# Patient Record
Sex: MALE
[De-identification: names, ages, dates, MRNs, and addresses within clinical notes are randomized per-mention and may not be internally consistent; named-entity substitution may affect disease eponyms.]

---

## 2019-11-08 ENCOUNTER — TRANSCRIPTION ENCOUNTER (OUTPATIENT)
Age: 54
End: 2019-11-08

## 2022-04-14 ENCOUNTER — APPOINTMENT (OUTPATIENT)
Dept: PAIN MANAGEMENT | Facility: CLINIC | Age: 57
End: 2022-04-14
Payer: COMMERCIAL

## 2022-04-14 VITALS — WEIGHT: 250 LBS | BODY MASS INDEX: 31.08 KG/M2 | HEIGHT: 75 IN

## 2022-04-14 PROCEDURE — 99213 OFFICE O/P EST LOW 20 MIN: CPT

## 2022-04-14 NOTE — ASSESSMENT
[FreeTextEntry1] : Discussion\par After discussing various treatment options with the patient including but not limited to oral medications, physical therapy, exercise,\par modalities as well as interventional spinal injections, we have decided with the following plan\par \par Medications Renewal\par The patient is stable on current pain medication with analgesia and without notable side effects or any obvious aberrant\par behaviors exhibited.\par \par There is clinically meaningful improvement in pain and function that outweighs risks to patient safety. I have discussed risks and\par realistic benefits of opioid therapy and patient and clinician responsibilities for managing therapy. Pain agreement has been\par signed.\par \par Will renew meds today\par \par Follow up in 4-6 weeks\par Follow up in 4-6 weeks or sooner if there are any problems.\par \par Conservative Care\par Continue Home exercises, stretching, activity modification, physical therapy, and conservative care.\par \par  \par I have consulted the  Registry for the purpose of reviewing the patient's controlled substance\par \par  Fall Risk Counseling\par The patient was provided Fall Risk counseling due to the prescribed medication(s). The patient is aware of the risks associated with\par their medication(s)\par

## 2022-04-14 NOTE — HISTORY OF PRESENT ILLNESS
[Lower back] : lower back [5] : 5 [Burning] : burning [Constant] : constant [Household chores] : household chores [Leisure] : leisure [Sleep] : sleep [Meds] : meds [Nothing helps with pain getting better] : Nothing helps with pain getting better [Sitting] : sitting [] : no [FreeTextEntry7] : b/l leg [de-identified] : Driving

## 2022-05-04 ENCOUNTER — APPOINTMENT (OUTPATIENT)
Dept: ORTHOPEDIC SURGERY | Facility: CLINIC | Age: 57
End: 2022-05-04
Payer: COMMERCIAL

## 2022-05-04 VITALS — BODY MASS INDEX: 31.08 KG/M2 | HEIGHT: 75 IN | WEIGHT: 250 LBS

## 2022-05-04 DIAGNOSIS — M75.122 COMPLETE ROTATOR CUFF TEAR OR RUPTURE OF LEFT SHOULDER, NOT SPECIFIED AS TRAUMATIC: ICD-10-CM

## 2022-05-04 PROCEDURE — 99213 OFFICE O/P EST LOW 20 MIN: CPT

## 2022-05-04 NOTE — PHYSICAL EXAM
[Left] : left shoulder [4 ___] : forward flexion 4[unfilled]/5 [5___] : internal rotation 5[unfilled]/5 [] : motor and sensory intact distally [FreeTextEntry9] : \par ER 25

## 2022-05-04 NOTE — ASSESSMENT
[FreeTextEntry1] : Now s/p L SA, SAD, acromioplasty, synovectomy, GHD, RCR.\par Continue PT for PROM, AROM as tolerated.\par OK for strengthening and resistance training.\par HEP.\par RTO 6 weeks.

## 2022-05-04 NOTE — HISTORY OF PRESENT ILLNESS
[5] : 5 [2] : 2 [Dull/Aching] : dull/aching [de-identified] :  DOS: 1/27/22 L SA, SAD, acromio, syn, GHD, RCR\par \par 5/4/22: Here for follow up. He is improving with PT but has some stiffnes and weakness.\par \par 3/22/22: Reports benefit and minimal pain. Working with PT. Not requiring anything for pain.\par \par 2/25/22: Here for follow up. Overall doing well with soreness. Stopped using the sling over the past 2-3 days.\par \par 2/8/22: Here for first follow up. He is off pain meds. He is in the sling.\par \par 12/28/21: Here for MRI with contrast review.\par \par MRI L shoulder with IV contrast:\par 1. 2.6 cm x 1.5 cm x 1.2 cm intramuscular ganglion within the anterior deltoid with decreased muscular edema when compared to prior study.\par 2. 10 x 12 mm full-thickness insertional tear of the supraspinatus with proximal tendinopathy and no muscle atrophy or tear. Remainder of the exam is unchanged.\par \par 12/15/21: Here for MRI review. \par \par MRI L shoulder:\par 1. AC joint arthrosis with narrowing of the supraspinatus outlet which can be seen with impingement. Infraspinatus tendinopathy and fraying. 15 mm full thickness insertional tear of the supraspinatus with bursitis and no muscle atrophy.\par 2. 18 mm x 12 mm septated fluid-signal intensity lesion within the anterior lateral deltoid and deep subcutaneous fat with muscular and soft tissue edema. Additional imaging with contrast is suggested to differentiate complex ganglion versus solid lesion.\par \par 12/1/21: 55 y/o M RHD here for left shoulder. He has been having pain for the past 1-2 months. He then went to [place] and [place] 11[place]21 [place] he had excruciating pain to the left shoulder. He has trouble lifting his arm and still painful. [] : Post Surgical Visit: no [FreeTextEntry1] : left shoulder [de-identified] : physical therapy

## 2022-06-14 ENCOUNTER — APPOINTMENT (OUTPATIENT)
Dept: ORTHOPEDIC SURGERY | Facility: CLINIC | Age: 57
End: 2022-06-14

## 2022-07-07 ENCOUNTER — APPOINTMENT (OUTPATIENT)
Dept: PAIN MANAGEMENT | Facility: CLINIC | Age: 57
End: 2022-07-07

## 2022-07-07 VITALS — WEIGHT: 253 LBS | BODY MASS INDEX: 31.46 KG/M2 | HEIGHT: 75 IN

## 2022-07-07 PROCEDURE — 99213 OFFICE O/P EST LOW 20 MIN: CPT

## 2022-07-07 NOTE — HISTORY OF PRESENT ILLNESS
[Lower back] : lower back [Dull/Aching] : dull/aching [Meds] : meds [5] : 5 [Burning] : burning [Constant] : constant [Household chores] : household chores [Leisure] : leisure [Sleep] : sleep [Nothing helps with pain getting better] : Nothing helps with pain getting better [Sitting] : sitting [de-identified] : Here for follow up.  will refill meds [] : no [FreeTextEntry7] : b/l leg [de-identified] : Driving

## 2022-10-06 ENCOUNTER — APPOINTMENT (OUTPATIENT)
Dept: PAIN MANAGEMENT | Facility: CLINIC | Age: 57
End: 2022-10-06

## 2022-10-06 VITALS — BODY MASS INDEX: 31.46 KG/M2 | HEIGHT: 75 IN | WEIGHT: 253 LBS

## 2022-10-06 PROCEDURE — 99213 OFFICE O/P EST LOW 20 MIN: CPT

## 2022-10-06 NOTE — HISTORY OF PRESENT ILLNESS
[Lower back] : lower back [Radiating] : radiating [Sharp] : sharp [Shooting] : shooting [Stabbing] : stabbing [Meds] : meds [Bending forward] : bending forward [Lying in bed] : lying in bed [Full time] : Work status: full time [5] : 5 [Burning] : burning [Dull/Aching] : dull/aching [Constant] : constant [Household chores] : household chores [Leisure] : leisure [Sleep] : sleep [Nothing helps with pain getting better] : Nothing helps with pain getting better [Sitting] : sitting [de-identified] : Here for follow up.  will refill meds [] : no [FreeTextEntry6] : shocking  [FreeTextEntry7] : b/l leg [de-identified] : Driving

## 2023-01-26 ENCOUNTER — APPOINTMENT (OUTPATIENT)
Dept: PAIN MANAGEMENT | Facility: CLINIC | Age: 58
End: 2023-01-26
Payer: COMMERCIAL

## 2023-01-26 VITALS — BODY MASS INDEX: 31.71 KG/M2 | WEIGHT: 255 LBS | HEIGHT: 75 IN

## 2023-01-26 PROCEDURE — 99213 OFFICE O/P EST LOW 20 MIN: CPT

## 2023-01-26 NOTE — HISTORY OF PRESENT ILLNESS
[Lower back] : lower back [Dull/Aching] : dull/aching [Radiating] : radiating [Tightness] : tightness [Constant] : constant [Household chores] : household chores [Leisure] : leisure [Sleep] : sleep [Social interactions] : social interactions [Meds] : meds [Nothing helps with pain getting better] : Nothing helps with pain getting better [Sitting] : sitting [Walking] : walking [Bending forward] : bending forward [Exercising] : exercising [Lying in bed] : lying in bed [Full time] : Work status: full time [5] : 5 [Burning] : burning [Sharp] : sharp [Shooting] : shooting [Stabbing] : stabbing [] : no [FreeTextEntry6] : shocking  [FreeTextEntry7] : b/l leg [de-identified] : Driving [de-identified] : Here for follow up.  will refill meds

## 2023-05-04 ENCOUNTER — APPOINTMENT (OUTPATIENT)
Dept: PAIN MANAGEMENT | Facility: CLINIC | Age: 58
End: 2023-05-04
Payer: COMMERCIAL

## 2023-05-04 VITALS — WEIGHT: 256 LBS | HEIGHT: 75 IN | BODY MASS INDEX: 31.83 KG/M2

## 2023-05-04 PROCEDURE — 20552 NJX 1/MLT TRIGGER POINT 1/2: CPT

## 2023-05-04 PROCEDURE — J3490M: CUSTOM

## 2023-05-04 PROCEDURE — 99214 OFFICE O/P EST MOD 30 MIN: CPT | Mod: 25

## 2023-05-04 NOTE — DISCUSSION/SUMMARY
[Medication Risks Reviewed] : Medication risks reviewed [de-identified] : Discussion\par After discussing various treatment options with the patient including but not limited to oral medications, physical therapy, exercise,\par modalities as well as interventional spinal injections, we have decided with the following plan\par \par Medications Renewal\par The patient is stable on current pain medication with analgesia and without notable side effects or any obvious aberrant\par behaviors exhibited.\par \par There is clinically meaningful improvement in pain and function that outweighs risks to patient safety. I have discussed risks and\par realistic benefits of opioid therapy and patient and clinician responsibilities for managing therapy. Pain agreement has been\par signed.\par \par Will renew meds today\par \par Follow up in 4-6 weeks\par Follow up in 4-6 weeks or sooner if there are any problems.\par \par Conservative Care\par \par Pt just recently refilled his final refill, so current script sent today won't be possible to fill until approx. 28 days from last fill. He understands this\par Continue Home exercises, stretching, activity modification, physical therapy, and conservative care.\par \par  \par I have consulted the  Registry for the purpose of reviewing the patient's controlled substance\par \par  Fall Risk Counseling\par The patient was provided Fall Risk counseling due to the prescribed medication(s). The patient is aware of the risks associated with\par their medication(s)\par \par Followup 3 months

## 2023-05-04 NOTE — PROCEDURE
[Trigger point 1-2 muscle groups] : trigger point 1-2 muscle groups [Right] : of the right [Rhomboid muscle] : rhomboid muscle [Pain] : pain [Inflammation] : inflammation [Alcohol] : alcohol [Ethyl Chloride sprayed topically] : ethyl chloride sprayed topically [Sterile technique used] : sterile technique used [___ cc    1%] : Lidocaine ~Vcc of 1%  [___ cc    0.25%] : Bupivacaine (Marcaine) ~Vcc of 0.25%  [___ cc    10mg] : Triamcinolone (Kenalog) ~Vcc of 10 mg

## 2023-05-04 NOTE — HISTORY OF PRESENT ILLNESS
[Lower back] : lower back [5] : 5 [Burning] : burning [Dull/Aching] : dull/aching [Radiating] : radiating [Sharp] : sharp [Shooting] : shooting [Stabbing] : stabbing [Tightness] : tightness [Constant] : constant [Household chores] : household chores [Leisure] : leisure [Sleep] : sleep [Social interactions] : social interactions [Meds] : meds [Nothing helps with pain getting better] : Nothing helps with pain getting better [Sitting] : sitting [Walking] : walking [Bending forward] : bending forward [Exercising] : exercising [Lying in bed] : lying in bed [Full time] : Work status: full time [FreeTextEntry1] : Pt reports pain onset in R rhomboid approx. 6 weeks ago after holding his nephew. WIll offer TPI today [] : no [FreeTextEntry6] : shocking  [FreeTextEntry7] : b/l leg [de-identified] : Driving [de-identified] : Here for follow up.  will refill meds

## 2023-09-07 ENCOUNTER — APPOINTMENT (OUTPATIENT)
Dept: PAIN MANAGEMENT | Facility: CLINIC | Age: 58
End: 2023-09-07
Payer: COMMERCIAL

## 2023-09-07 VITALS — BODY MASS INDEX: 29.84 KG/M2 | HEIGHT: 75 IN | WEIGHT: 240 LBS

## 2023-09-07 DIAGNOSIS — Z86.39 PERSONAL HISTORY OF OTHER ENDOCRINE, NUTRITIONAL AND METABOLIC DISEASE: ICD-10-CM

## 2023-09-07 PROCEDURE — 99214 OFFICE O/P EST MOD 30 MIN: CPT

## 2023-09-07 NOTE — PHYSICAL EXAM
[Extension] : extension [] : positive Melendez maneuver facet loading [TWNoteComboBox7] : forward flexion 75 degrees [de-identified] : extension 20 degrees

## 2023-09-07 NOTE — HISTORY OF PRESENT ILLNESS
[Lower back] : lower back [Buttock] : buttock [2] : 2 [Dull/Aching] : dull/aching [Radiating] : radiating [Stabbing] : stabbing [Household chores] : household chores [Leisure] : leisure [Sexual activity] : sexual activity [Meds] : meds [Nothing helps with pain getting better] : Nothing helps with pain getting better [FreeTextEntry1] : 9/7/23: Evaluation today. Pt with pain in the back with radiation to the b/l buttocks. Pain is intermittent and random. Pain described as a stabbing pain.  Had CHEVY's about 8 years ago.   MRI: lumbar spine: 7/8/21: Impression: 1. Convex left curvature, new from the prior study. Straightening of lordosis which is stable. 2. Diffuse multilevel bulging and facet hypertrophy L1-L2 through L4-L5 with minor retrolisthesis at L4-L5. This is stable from the prior study. 3. L5-S1: Loss of disc signal and height with minor retrolisthesis. This is stable from the prior study. Central and asymmetric to right herniation contacting the S1 nerve roots right greater than left with mild central stenosis. The herniation is slightly increased in size with respect to the asymmetric to right component, best noted on axial T2 image 19. [] : no [de-identified] : sandi

## 2023-09-07 NOTE — ASSESSMENT
[FreeTextEntry1] : After discussing various treatment options with the patient including but not limited to oral medications, physical therapy, exercise, modalities as well as interventional spinal injections, we have decided with the following plan:  1) Intervention Injection Therapy: I personally reviewed the MRI/CT scan images and agree with the radiologist's report. The radiological findings were discussed with the patient. The risks, benefits, contents and alternatives to injection were explained in full to the patient. Risks outlined include but are not limited to infection,sepsis, bleeding, post-dural puncture headache, nerve damage, temporary increase in pain, syncopal episode, failure to resolve symptoms, allergic reaction, symptom recurrence, and elevation of blood sugar in diabetics. Cortisone may cause immunosuppression. Patient understands the risks. All questions were answered. After discussion of options, patient requested an injection. Information regarding the injection was given to the patient. Which medications to stop prior to the injection was explained to the patient as well.  Follow up in 1-2 weeks post injection for re-evaluation.  Continue Home exercises, stretching, activity modification, physical therapy, and conservative care. Patient presents with axial lumbar pain that has not responded to  3 months of conservative therapy including physical therapy or NSAID therapy.  The pain is interfering with activities of daily living and functionality.   There is no radicular pain.   The pain is exacerbated by facet loading.  Positive Kemps maneuver which is defined by pain reproduction with extension and rotation of the lumbar spine to the affected side.  The patient has not had a vertebral fusion at the levels of the proposed treatment.  There is no unexplained neurologic deficit.  There is no history of systemic infection, unstable medical condition, bleeding tendency, or local infection.  The injection is being performed to diagnose the facet joint as the source of the individual's pain.  b/l lumbar mbb#[id number]  2) Patient with meaningful improvement of pain on current regimen of medications. will provide refill today. Patient denies any side effects. Risks and benefits reviewed with patient. Patient will follow up  if pain changes, gets worse or stops responding to medications.

## 2024-02-01 ENCOUNTER — APPOINTMENT (OUTPATIENT)
Dept: PAIN MANAGEMENT | Facility: CLINIC | Age: 59
End: 2024-02-01
Payer: COMMERCIAL

## 2024-02-01 VITALS — HEIGHT: 75 IN | WEIGHT: 250 LBS | BODY MASS INDEX: 31.08 KG/M2

## 2024-02-01 DIAGNOSIS — G89.4 CHRONIC PAIN SYNDROME: ICD-10-CM

## 2024-02-01 PROCEDURE — 99214 OFFICE O/P EST MOD 30 MIN: CPT

## 2024-02-01 RX ORDER — TRAMADOL HYDROCHLORIDE 50 MG/1
50 TABLET, COATED ORAL
Qty: 90 | Refills: 0 | Status: ACTIVE | COMMUNITY
Start: 2022-04-14 | End: 1900-01-01

## 2024-02-01 NOTE — HISTORY OF PRESENT ILLNESS
[Lower back] : lower back [Buttock] : buttock [2] : 2 [Dull/Aching] : dull/aching [Radiating] : radiating [Stabbing] : stabbing [Household chores] : household chores [Leisure] : leisure [Sexual activity] : sexual activity [Meds] : meds [Nothing helps with pain getting better] : Nothing helps with pain getting better [3] : 3 [Constant] : constant [FreeTextEntry1] : 02/01/2024 Follow up today.  Had a flare up about 2 weeks ago but now has improved.    9/7/23: Evaluation today. Pt with pain in the back with radiation to the b/l buttocks. Pain is intermittent and random. Pain described as a stabbing pain.  Had CHEVY's about 8 years ago.   MRI: lumbar spine: 7/8/21: Impression: 1. Convex left curvature, new from the prior study. Straightening of lordosis which is stable. 2. Diffuse multilevel bulging and facet hypertrophy L1-L2 through L4-L5 with minor retrolisthesis at L4-L5. This is stable from the prior study. 3. L5-S1: Loss of disc signal and height with minor retrolisthesis. This is stable from the prior study. Central and asymmetric to right herniation contacting the S1 nerve roots right greater than left with mild central stenosis. The herniation is slightly increased in size with respect to the asymmetric to right component, best noted on axial T2 image 19. [] : no [de-identified] : activity [de-identified] : sandi

## 2024-02-01 NOTE — PHYSICAL EXAM
[Extension] : extension [] : no swelling [TWNoteComboBox7] : forward flexion 75 degrees [de-identified] : extension 20 degrees

## 2024-02-07 ENCOUNTER — APPOINTMENT (OUTPATIENT)
Dept: MRI IMAGING | Facility: CLINIC | Age: 59
End: 2024-02-07

## 2024-02-29 ENCOUNTER — APPOINTMENT (OUTPATIENT)
Dept: PAIN MANAGEMENT | Facility: CLINIC | Age: 59
End: 2024-02-29